# Patient Record
Sex: MALE | Race: BLACK OR AFRICAN AMERICAN | NOT HISPANIC OR LATINO | Employment: FULL TIME | RURAL
[De-identification: names, ages, dates, MRNs, and addresses within clinical notes are randomized per-mention and may not be internally consistent; named-entity substitution may affect disease eponyms.]

---

## 2021-08-02 ENCOUNTER — HOSPITAL ENCOUNTER (EMERGENCY)
Facility: HOSPITAL | Age: 31
Discharge: HOME | End: 2021-08-02

## 2021-08-02 VITALS
HEIGHT: 67 IN | WEIGHT: 155 LBS | HEART RATE: 66 BPM | SYSTOLIC BLOOD PRESSURE: 120 MMHG | OXYGEN SATURATION: 98 % | RESPIRATION RATE: 16 BRPM | BODY MASS INDEX: 24.33 KG/M2 | TEMPERATURE: 99 F | DIASTOLIC BLOOD PRESSURE: 80 MMHG

## 2021-08-02 DIAGNOSIS — M62.838 MUSCLE SPASM: Primary | ICD-10-CM

## 2021-08-02 PROCEDURE — 99282 EMERGENCY DEPT VISIT SF MDM: CPT

## 2021-08-02 PROCEDURE — 99283 EMERGENCY DEPT VISIT LOW MDM: CPT | Mod: ,,, | Performed by: NURSE PRACTITIONER

## 2021-08-02 PROCEDURE — 99283 PR EMERGENCY DEPT VISIT,LEVEL III: ICD-10-PCS | Mod: ,,, | Performed by: NURSE PRACTITIONER

## 2021-08-02 RX ORDER — METHOCARBAMOL 500 MG/1
1000 TABLET, FILM COATED ORAL 3 TIMES DAILY
Qty: 30 TABLET | Refills: 0 | Status: SHIPPED | OUTPATIENT
Start: 2021-08-02 | End: 2021-08-07

## 2021-08-02 RX ORDER — IBUPROFEN 800 MG/1
800 TABLET ORAL 3 TIMES DAILY
Qty: 15 TABLET | Refills: 0 | Status: SHIPPED | OUTPATIENT
Start: 2021-08-02 | End: 2021-08-07

## 2022-01-22 ENCOUNTER — HOSPITAL ENCOUNTER (EMERGENCY)
Facility: HOSPITAL | Age: 32
Discharge: HOME | End: 2022-01-22

## 2022-01-22 VITALS
TEMPERATURE: 99 F | SYSTOLIC BLOOD PRESSURE: 125 MMHG | WEIGHT: 150 LBS | OXYGEN SATURATION: 98 % | DIASTOLIC BLOOD PRESSURE: 72 MMHG | HEART RATE: 80 BPM | BODY MASS INDEX: 23.54 KG/M2 | HEIGHT: 67 IN | RESPIRATION RATE: 18 BRPM

## 2022-01-22 DIAGNOSIS — L03.90 CELLULITIS, UNSPECIFIED CELLULITIS SITE: Primary | ICD-10-CM

## 2022-01-22 PROCEDURE — 99283 EMERGENCY DEPT VISIT LOW MDM: CPT

## 2022-01-22 PROCEDURE — 99283 EMERGENCY DEPT VISIT LOW MDM: CPT | Mod: ,,, | Performed by: NURSE PRACTITIONER

## 2022-01-22 PROCEDURE — 99283 PR EMERGENCY DEPT VISIT,LEVEL III: ICD-10-PCS | Mod: ,,, | Performed by: NURSE PRACTITIONER

## 2022-01-22 RX ORDER — CLINDAMYCIN HYDROCHLORIDE 150 MG/1
300 CAPSULE ORAL 4 TIMES DAILY
Qty: 56 CAPSULE | Refills: 0 | Status: SHIPPED | OUTPATIENT
Start: 2022-01-22 | End: 2022-01-29

## 2022-01-22 NOTE — ED TRIAGE NOTES
Pt presents to the ed c/o a reaction to his antibiotic that he is currently taking for cellulitis

## 2022-01-22 NOTE — DISCHARGE INSTRUCTIONS
Stop taking Bactrim DS.  May take tramadol as needed.  Follow-up with primary care provider on 01/24/2022 or sooner as needed.

## 2022-01-22 NOTE — ED PROVIDER NOTES
Encounter Date: 1/22/2022       History     Chief Complaint   Patient presents with    Headache    Diarrhea     Year old male presents ambulatory to the emergency department with complaint of frontal headache, nausea, occasional diarrhea.  States symptoms began after starting on Bactrim DS.  He was reportedly seen at [facility] on 01/14/2022.  He was treated for cellulitis of his right forearm.  He states he has gotten much better.  States that he was also given tramadol  to take it with Bactrim daily.  He has not been able to work due to concerns of tramadol causing dizziness.  States that he has had no fever.  It ranges from 97 to 90 8° F. states that he has the symptoms of nausea headache and abdominal discomfort shortly after taking Bactrim DS.          Review of patient's allergies indicates:   Allergen Reactions    Amoxicillin Anaphylaxis    Iodine and iodide containing products Itching and Swelling     History reviewed. No pertinent past medical history.  History reviewed. No pertinent surgical history.  History reviewed. No pertinent family history.  Social History     Tobacco Use    Smoking status: Current Every Day Smoker    Smokeless tobacco: Never Used   Substance Use Topics    Alcohol use: Never     Review of Systems   Constitutional: Positive for appetite change. Negative for activity change, fatigue and fever.   HENT: Negative for sore throat and trouble swallowing.    Respiratory: Negative for cough, chest tightness, shortness of breath and wheezing.    Cardiovascular: Negative for chest pain and leg swelling.   Gastrointestinal: Positive for diarrhea and nausea. Negative for abdominal distention, abdominal pain, constipation and vomiting.   Genitourinary: Negative for decreased urine volume, difficulty urinating and urgency.   Musculoskeletal: Negative for arthralgias, back pain, joint swelling and myalgias.   Skin:        Cellulitis right forearm   Neurological:  Positive for headaches. Negative for speech difficulty, weakness, light-headedness and numbness.       Physical Exam     Initial Vitals [01/22/22 1002]   BP Pulse Resp Temp SpO2   125/72 80 18 98.8 °F (37.1 °C) 98 %      MAP       --         Physical Exam    Constitutional: He appears well-developed and well-nourished.   HENT:   Head: Normocephalic.   Right Ear: External ear normal.   Left Ear: External ear normal.   Nose: Nose normal.   Mouth/Throat: Oropharynx is clear and moist.   Eyes: Conjunctivae are normal. Pupils are equal, round, and reactive to light.   Neck: Neck supple.   Cardiovascular: Normal rate, regular rhythm, normal heart sounds and intact distal pulses.   Pulmonary/Chest: Breath sounds normal.   Abdominal: Abdomen is soft. Bowel sounds are normal. There is no abdominal tenderness. There is no rebound and no guarding.   Musculoskeletal:      Cervical back: Neck supple.     Lymphadenopathy:     He has no cervical adenopathy.   Neurological: He is alert and oriented to person, place, and time. GCS score is 15. GCS eye subscore is 4. GCS verbal subscore is 5. GCS motor subscore is 6.   Skin: Skin is warm and dry. Capillary refill takes less than 2 seconds.   Tear drop shaped soft fluid-filled area noted in the middle of his right forearm dorsal surface.  No redness.  No warmth.  No pain.  No streaking or redness.         Medical Screening Exam   See Full Note    ED Course   Procedures  Labs Reviewed - No data to display       Imaging Results    None          Medications - No data to display                    Clinical Impression:   Final diagnoses:  [L03.90] Cellulitis, unspecified cellulitis site (Primary)          ED Disposition Condition    Discharge Stable        ED Prescriptions     Medication Sig Dispense Start Date End Date Auth. Provider    clindamycin (CLEOCIN) 150 MG capsule Take 2 capsules (300 mg total) by mouth 4 (four) times daily. for 7 days 56 capsule 1/22/2022 1/29/2022 Dimitrios HOWELL  ARTURO Paez        Follow-up Information    None          Dimitrios Paez, ARTURO  01/22/22 2459

## 2022-05-26 ENCOUNTER — LAB VISIT (OUTPATIENT)
Dept: PRIMARY CARE CLINIC | Facility: CLINIC | Age: 32
End: 2022-05-26

## 2022-05-26 DIAGNOSIS — Z02.83 ENCOUNTER FOR DRUG SCREENING: Primary | ICD-10-CM

## 2022-05-26 PROCEDURE — 99000 PR SPECIMEN HANDLING,DR OFF->LAB: ICD-10-PCS | Mod: ,,, | Performed by: NURSE PRACTITIONER

## 2022-05-26 PROCEDURE — 99000 SPECIMEN HANDLING OFFICE-LAB: CPT | Mod: ,,, | Performed by: NURSE PRACTITIONER

## 2022-10-26 ENCOUNTER — HOSPITAL ENCOUNTER (EMERGENCY)
Facility: HOSPITAL | Age: 32
Discharge: HOME | End: 2022-10-26

## 2022-10-26 VITALS
BODY MASS INDEX: 22.44 KG/M2 | RESPIRATION RATE: 20 BRPM | WEIGHT: 143 LBS | OXYGEN SATURATION: 100 % | DIASTOLIC BLOOD PRESSURE: 81 MMHG | HEART RATE: 67 BPM | TEMPERATURE: 98 F | SYSTOLIC BLOOD PRESSURE: 119 MMHG | HEIGHT: 67 IN

## 2022-10-26 DIAGNOSIS — J11.1 INFLUENZA: Primary | ICD-10-CM

## 2022-10-26 PROCEDURE — 99284 PR EMERGENCY DEPT VISIT,LEVEL IV: ICD-10-PCS | Mod: ,,, | Performed by: NURSE PRACTITIONER

## 2022-10-26 PROCEDURE — 99283 EMERGENCY DEPT VISIT LOW MDM: CPT

## 2022-10-26 PROCEDURE — 99284 EMERGENCY DEPT VISIT MOD MDM: CPT | Mod: ,,, | Performed by: NURSE PRACTITIONER

## 2022-10-26 RX ORDER — ONDANSETRON 4 MG/1
4 TABLET, ORALLY DISINTEGRATING ORAL EVERY 8 HOURS PRN
Qty: 10 TABLET | Refills: 0 | Status: SHIPPED | OUTPATIENT
Start: 2022-10-26

## 2022-10-26 NOTE — Clinical Note
"Rajni LONG" Remy was seen and treated in our emergency department on 10/26/2022.  He may return to work on 10/28/2022.       If you have any questions or concerns, please don't hesitate to call.      ARTURO Sidhu"

## 2022-10-26 NOTE — ED PROVIDER NOTES
Encounter Date: 10/26/2022       History     Chief Complaint   Patient presents with    Abdominal Pain    Cough    Generalized Body Aches    Sore Throat     31 year old male presents to the emergency department to be evaluated for runny nose, cough, nausea, vomiting and diarrhea. He has had a runny nose and cough for the past 4 days. He began vomiting a couple of days ago. Denies any nausea or vomiting today. He has been drinking fluids without any difficulty. He has been around several people who tested positive for flu.     The history is provided by the patient.   Emesis   This is a new problem. The current episode started two days ago. Associated symptoms include cough, diarrhea and URI. Pertinent negatives include no abdominal pain, no arthralgias, no chills, no fever, no headaches, no myalgias and no sweats.   Review of patient's allergies indicates:   Allergen Reactions    Amoxicillin Anaphylaxis    Iodine and iodide containing products Itching and Swelling     No past medical history on file.  No past surgical history on file.  No family history on file.  Social History     Tobacco Use    Smoking status: Every Day    Smokeless tobacco: Never   Substance Use Topics    Alcohol use: Never     Review of Systems   Constitutional:  Negative for chills and fever.   HENT:  Positive for congestion and rhinorrhea.    Respiratory:  Positive for cough.    Gastrointestinal:  Positive for diarrhea, nausea and vomiting. Negative for abdominal pain.   Musculoskeletal:  Negative for arthralgias and myalgias.   Neurological:  Negative for headaches.   All other systems reviewed and are negative.    Physical Exam     Initial Vitals [10/26/22 1048]   BP Pulse Resp Temp SpO2   119/81 67 20 98.2 °F (36.8 °C) 100 %      MAP       --         Physical Exam    Vitals reviewed.  Constitutional: He appears well-developed and well-nourished.   HENT:   Right Ear: Tympanic membrane normal.   Left Ear: Tympanic membrane normal.    Mouth/Throat: Oropharynx is clear and moist.   Neck: Neck supple.   Cardiovascular:  Normal rate and regular rhythm.           Pulmonary/Chest: Breath sounds normal.   Abdominal: Abdomen is soft. Bowel sounds are normal. He exhibits no distension and no mass. There is no abdominal tenderness. There is no rebound and no guarding.   Musculoskeletal:         General: Normal range of motion.      Cervical back: Neck supple.     Neurological: He is alert and oriented to person, place, and time. He has normal strength. GCS score is 15. GCS eye subscore is 4. GCS verbal subscore is 5. GCS motor subscore is 6.   Skin: Skin is warm and dry. Capillary refill takes less than 2 seconds.   Psychiatric: He has a normal mood and affect.       Medical Screening Exam   See Full Note    ED Course   Procedures  Labs Reviewed - No data to display       Imaging Results    None          Medications - No data to display                    Clinical Impression:   Final diagnoses:  [J11.1] Influenza (Primary)      ED Disposition Condition    Discharge Stable          ED Prescriptions       Medication Sig Dispense Start Date End Date Auth. Provider    ondansetron (ZOFRAN-ODT) 4 MG TbDL Take 1 tablet (4 mg total) by mouth every 8 (eight) hours as needed. 10 tablet 10/26/2022 -- ARTURO Sidhu          Follow-up Information    None          ARTURO Sidhu  10/26/22 1126

## 2022-10-26 NOTE — ED TRIAGE NOTES
Started having body aches, fever, cough with mucous production, n/v/d, and sore throat since Thursday. Took ibuprofen this am for temp of 100.0 and taking theraflu

## 2022-10-26 NOTE — DISCHARGE INSTRUCTIONS
Clear liquid diet. Advance diet as tolerated. Take zofran as needed for nausea. Follow up with your primary care provider in 2 days. Return to the emergency department for any increase in symptoms or for any other new or worrisome symptoms.

## 2022-10-27 ENCOUNTER — TELEPHONE (OUTPATIENT)
Dept: EMERGENCY MEDICINE | Facility: HOSPITAL | Age: 32
End: 2022-10-27

## 2023-04-10 ENCOUNTER — HOSPITAL ENCOUNTER (OUTPATIENT)
Dept: RADIOLOGY | Facility: HOSPITAL | Age: 33
Discharge: HOME | End: 2023-04-10
Attending: NURSE PRACTITIONER

## 2023-04-10 DIAGNOSIS — M25.572 LEFT ANKLE PAIN: ICD-10-CM

## 2023-04-10 PROCEDURE — 73610 X-RAY EXAM OF ANKLE: CPT | Mod: TC,LT

## 2023-04-10 PROCEDURE — 73610 X-RAY EXAM OF ANKLE: CPT | Mod: 26,LT,, | Performed by: RADIOLOGY

## 2023-04-10 PROCEDURE — 73610 XR ANKLE COMPLETE 3 VIEW LEFT: ICD-10-PCS | Mod: 26,LT,, | Performed by: RADIOLOGY

## 2024-02-13 ENCOUNTER — HOSPITAL ENCOUNTER (EMERGENCY)
Facility: HOSPITAL | Age: 34
Discharge: HOME | End: 2024-02-13

## 2024-02-13 VITALS
TEMPERATURE: 98 F | WEIGHT: 159.88 LBS | RESPIRATION RATE: 18 BRPM | HEART RATE: 70 BPM | SYSTOLIC BLOOD PRESSURE: 132 MMHG | OXYGEN SATURATION: 100 % | HEIGHT: 67 IN | BODY MASS INDEX: 25.09 KG/M2 | DIASTOLIC BLOOD PRESSURE: 75 MMHG

## 2024-02-13 DIAGNOSIS — J03.90 ACUTE TONSILLITIS, UNSPECIFIED ETIOLOGY: Primary | ICD-10-CM

## 2024-02-13 LAB
FLUAV AG UPPER RESP QL IA.RAPID: NEGATIVE
FLUBV AG UPPER RESP QL IA.RAPID: NEGATIVE
RAPID GROUP A STREP: NEGATIVE
SARS-COV-2 RDRP RESP QL NAA+PROBE: NEGATIVE

## 2024-02-13 PROCEDURE — 87804 INFLUENZA ASSAY W/OPTIC: CPT | Performed by: NURSE PRACTITIONER

## 2024-02-13 PROCEDURE — 87635 SARS-COV-2 COVID-19 AMP PRB: CPT | Performed by: NURSE PRACTITIONER

## 2024-02-13 PROCEDURE — 87880 STREP A ASSAY W/OPTIC: CPT | Performed by: NURSE PRACTITIONER

## 2024-02-13 PROCEDURE — 99284 EMERGENCY DEPT VISIT MOD MDM: CPT | Mod: ,,, | Performed by: NURSE PRACTITIONER

## 2024-02-13 PROCEDURE — 96375 TX/PRO/DX INJ NEW DRUG ADDON: CPT

## 2024-02-13 PROCEDURE — 99284 EMERGENCY DEPT VISIT MOD MDM: CPT | Mod: 25

## 2024-02-13 PROCEDURE — 63600175 PHARM REV CODE 636 W HCPCS: Performed by: NURSE PRACTITIONER

## 2024-02-13 PROCEDURE — 96361 HYDRATE IV INFUSION ADD-ON: CPT

## 2024-02-13 PROCEDURE — 96374 THER/PROPH/DIAG INJ IV PUSH: CPT

## 2024-02-13 PROCEDURE — 25000003 PHARM REV CODE 250: Performed by: NURSE PRACTITIONER

## 2024-02-13 RX ORDER — AZITHROMYCIN 250 MG/1
250 TABLET, FILM COATED ORAL DAILY
Qty: 6 TABLET | Refills: 0 | Status: SHIPPED | OUTPATIENT
Start: 2024-02-13

## 2024-02-13 RX ORDER — BENZONATATE 100 MG/1
100 CAPSULE ORAL 3 TIMES DAILY PRN
Qty: 20 CAPSULE | Refills: 0 | Status: SHIPPED | OUTPATIENT
Start: 2024-02-13 | End: 2024-02-23

## 2024-02-13 RX ORDER — ONDANSETRON HYDROCHLORIDE 2 MG/ML
4 INJECTION, SOLUTION INTRAVENOUS
Status: COMPLETED | OUTPATIENT
Start: 2024-02-13 | End: 2024-02-13

## 2024-02-13 RX ORDER — BENZONATATE 100 MG/1
100 CAPSULE ORAL 3 TIMES DAILY PRN
Qty: 20 CAPSULE | Refills: 0 | Status: SHIPPED | OUTPATIENT
Start: 2024-02-13 | End: 2024-02-13

## 2024-02-13 RX ORDER — ONDANSETRON 4 MG/1
4 TABLET, ORALLY DISINTEGRATING ORAL EVERY 6 HOURS PRN
Qty: 15 TABLET | Refills: 0 | Status: SHIPPED | OUTPATIENT
Start: 2024-02-13

## 2024-02-13 RX ORDER — METHYLPREDNISOLONE 4 MG/1
TABLET ORAL
Qty: 21 EACH | Refills: 0 | Status: SHIPPED | OUTPATIENT
Start: 2024-02-13 | End: 2024-03-05

## 2024-02-13 RX ORDER — DEXAMETHASONE SODIUM PHOSPHATE 4 MG/ML
4 INJECTION, SOLUTION INTRA-ARTICULAR; INTRALESIONAL; INTRAMUSCULAR; INTRAVENOUS; SOFT TISSUE
Status: COMPLETED | OUTPATIENT
Start: 2024-02-13 | End: 2024-02-13

## 2024-02-13 RX ADMIN — SODIUM CHLORIDE 1000 ML: 9 INJECTION, SOLUTION INTRAVENOUS at 07:02

## 2024-02-13 RX ADMIN — DEXAMETHASONE SODIUM PHOSPHATE 4 MG: 4 INJECTION, SOLUTION INTRA-ARTICULAR; INTRALESIONAL; INTRAMUSCULAR; INTRAVENOUS; SOFT TISSUE at 07:02

## 2024-02-13 RX ADMIN — ONDANSETRON 4 MG: 2 INJECTION INTRAMUSCULAR; INTRAVENOUS at 07:02

## 2024-02-13 NOTE — Clinical Note
"Rajni LONG" Remy was seen and treated in our emergency department on 2/13/2024.  He may return to work on 02/16/2024.       If you have any questions or concerns, please don't hesitate to call.      Rosaura Bustos, FNP"

## 2024-02-14 NOTE — DISCHARGE INSTRUCTIONS
Take medications prescribed.  Monitor fever and treated greater than 100.3 with alterations of Tylenol and ibuprofen.  Follow up with primary care provider in 2 days if symptoms do not improve with treatment.  Return to the ER with new or worsening symptoms.

## 2024-02-14 NOTE — ED PROVIDER NOTES
Encounter Date: 2/13/2024       History     Chief Complaint   Patient presents with    Generalized Body Aches    Vomiting    Nausea    Headache     Patient presents to ER with complaint of generalized body aches, chills, and cough.  Patient reports decreased appetite, Reports nausea, vomiting, and diarrhea. Reports sore throat and headache.  Cough is non-productive.  Reports his symptoms started 4 days ago.  He denies fever.  Reports he feels dehydrated.    The history is provided by the patient and a parent. No  was used.     Review of patient's allergies indicates:   Allergen Reactions    Amoxicillin Anaphylaxis    Iodine and iodide containing products Itching and Swelling     History reviewed. No pertinent past medical history.  History reviewed. No pertinent surgical history.  History reviewed. No pertinent family history.  Social History     Tobacco Use    Smoking status: Every Day    Smokeless tobacco: Never   Substance Use Topics    Alcohol use: Never     Review of Systems   Constitutional:  Positive for activity change, appetite change, chills, fatigue and fever.   HENT:  Positive for congestion, postnasal drip, sore throat and trouble swallowing.    Respiratory:  Positive for cough.    Gastrointestinal:  Positive for diarrhea, nausea and vomiting.   Neurological:  Positive for headaches.   All other systems reviewed and are negative.      Physical Exam     Initial Vitals [02/13/24 1820]   BP Pulse Resp Temp SpO2   120/72 83 18 98.4 °F (36.9 °C) 100 %      MAP       --         Physical Exam    Nursing note and vitals reviewed.  Constitutional: He appears well-developed and well-nourished.   HENT:   Head: Normocephalic.   Right Ear: External ear normal.   Left Ear: External ear normal.   Nose: Nose normal.   Mouth/Throat: Oropharyngeal exudate present.   Eyes: EOM are normal.   Neck:   Normal range of motion.  Cardiovascular:  Normal rate, normal heart sounds and intact distal pulses.            Pulmonary/Chest: Breath sounds normal.   Abdominal: Abdomen is soft. Bowel sounds are normal.   Musculoskeletal:         General: Normal range of motion.      Cervical back: Normal range of motion.     Neurological: He is alert and oriented to person, place, and time. He has normal strength. GCS score is 15. GCS eye subscore is 4. GCS verbal subscore is 5. GCS motor subscore is 6.   Skin: Skin is warm and dry. Capillary refill takes less than 2 seconds.   Psychiatric: He has a normal mood and affect. His behavior is normal. Judgment and thought content normal.         Medical Screening Exam   See Full Note    ED Course   Procedures  Labs Reviewed   RAPID INFLUENZA A/B - Normal   THROAT SCREEN, RAPID STREP - Normal   SARS-COV-2 RNA AMPLIFICATION, QUAL - Normal    Narrative:     Negative SARS-CoV results should not be used as the sole basis for treatment or patient management decisions; negative results should be considered in the context of a patient's recent exposures, history and the presene of clinical signs and symptoms consistent with COVID-19.  Negative results should be treated as presumptive and confirmed by molecular assay, if necessary for patient management.          Imaging Results    None          Medications   sodium chloride 0.9% bolus 1,000 mL 1,000 mL (1,000 mLs Intravenous New Bag 2/13/24 1900)   dexAMETHasone injection 4 mg (has no administration in time range)   ondansetron injection 4 mg (4 mg Intravenous Given 2/13/24 1900)     Medical Decision Making  Patient presents to ER with complaint of generalized body aches, chills, and cough.  Patient reports decreased appetite, Reports nausea, vomiting, and diarrhea. Reports sore throat and headache.  Cough is non-productive.  Reports his symptoms started 4 days ago.  He denies fever.  Reports he feels dehydrated.      Amount and/or Complexity of Data Reviewed  Independent Historian: parent  Labs: ordered. Decision-making details documented in ED  Course.  Discussion of management or test interpretation with external provider(s): Initiate IV, collect lab work  1 L normal saline bolus  4 mg Zofran IV treat nausea  Decadron 4 mg IV to treat inflammation and acute tonsillitis.    Patient was discharged home with diagnosis of acute tonsillitis.  He was given prescription for Zofran, Tessalon Perles, Medrol Dosepak and azithromycin.    Risk  Prescription drug management.                                      Clinical Impression:   Final diagnoses:  [J03.90] Acute tonsillitis, unspecified etiology (Primary)        ED Disposition Condition    Discharge Stable          ED Prescriptions       Medication Sig Dispense Start Date End Date Auth. Provider    ondansetron (ZOFRAN-ODT) 4 MG TbDL Take 1 tablet (4 mg total) by mouth every 6 (six) hours as needed (Nausea vomiting). 15 tablet 2/13/2024 -- Rosaura Bustos FNP    azithromycin (Z-EDWIGE) 250 MG tablet Take 1 tablet (250 mg total) by mouth once daily. Take first 2 tablets together, then 1 every day until finished. 6 tablet 2/13/2024 -- Rosaura Bustos FNP    methylPREDNISolone (MEDROL DOSEPACK) 4 mg tablet use as directed 21 each 2/13/2024 3/5/2024 Rosaura Bustos R, FNP    benzonatate (TESSALON) 100 MG capsule  (Status: Discontinued) Take 1 capsule (100 mg total) by mouth 3 (three) times daily as needed for Cough. 20 capsule 2/13/2024 2/13/2024 Rosaura Bustos, FNP    benzonatate (TESSALON) 100 MG capsule Take 1 capsule (100 mg total) by mouth 3 (three) times daily as needed for Cough. 20 capsule 2/13/2024 2/23/2024 Rosaura Bustos FNP          Follow-up Information       Follow up With Specialties Details Why Contact Info    Primary care provider  Schedule an appointment as soon as possible for a visit in 2 days If symptoms worsen              Rosaura Bustos FNP  02/13/24 1953

## 2024-10-23 ENCOUNTER — HOSPITAL ENCOUNTER (EMERGENCY)
Facility: HOSPITAL | Age: 34
Discharge: HOME | End: 2024-10-23

## 2024-10-23 VITALS
OXYGEN SATURATION: 100 % | SYSTOLIC BLOOD PRESSURE: 126 MMHG | HEIGHT: 67 IN | TEMPERATURE: 99 F | RESPIRATION RATE: 17 BRPM | DIASTOLIC BLOOD PRESSURE: 88 MMHG | BODY MASS INDEX: 24.01 KG/M2 | WEIGHT: 153 LBS | HEART RATE: 55 BPM

## 2024-10-23 DIAGNOSIS — J06.9 UPPER RESPIRATORY TRACT INFECTION, UNSPECIFIED TYPE: Primary | ICD-10-CM

## 2024-10-23 LAB
INFLUENZA A MOLECULAR (OHS): NEGATIVE
INFLUENZA B MOLECULAR (OHS): NEGATIVE
SARS-COV-2 RDRP RESP QL NAA+PROBE: NEGATIVE

## 2024-10-23 PROCEDURE — 99284 EMERGENCY DEPT VISIT MOD MDM: CPT

## 2024-10-23 PROCEDURE — 87635 SARS-COV-2 COVID-19 AMP PRB: CPT | Performed by: NURSE PRACTITIONER

## 2024-10-23 PROCEDURE — 87502 INFLUENZA DNA AMP PROBE: CPT | Performed by: NURSE PRACTITIONER

## 2024-10-23 RX ORDER — ONDANSETRON 4 MG/1
4 TABLET, ORALLY DISINTEGRATING ORAL EVERY 8 HOURS PRN
Qty: 15 TABLET | Refills: 0 | Status: SHIPPED | OUTPATIENT
Start: 2024-10-23

## 2024-10-23 RX ORDER — PROMETHAZINE HYDROCHLORIDE 25 MG/1
25 TABLET ORAL EVERY 6 HOURS PRN
Qty: 15 TABLET | Refills: 0 | Status: SHIPPED | OUTPATIENT
Start: 2024-10-23

## 2024-10-23 RX ORDER — LORATADINE PSEUDOEPHEDRINE SULFATE 10; 240 MG/1; MG/1
1 TABLET, EXTENDED RELEASE ORAL DAILY
COMMUNITY
Start: 2024-10-23 | End: 2024-11-02

## 2025-03-10 ENCOUNTER — HOSPITAL ENCOUNTER (EMERGENCY)
Facility: HOSPITAL | Age: 35
Discharge: HOME | End: 2025-03-10
Attending: FAMILY MEDICINE

## 2025-03-10 VITALS
HEIGHT: 67 IN | WEIGHT: 150 LBS | BODY MASS INDEX: 23.54 KG/M2 | TEMPERATURE: 98 F | RESPIRATION RATE: 17 BRPM | OXYGEN SATURATION: 99 % | SYSTOLIC BLOOD PRESSURE: 149 MMHG | DIASTOLIC BLOOD PRESSURE: 91 MMHG | HEART RATE: 58 BPM

## 2025-03-10 DIAGNOSIS — K59.00 CONSTIPATION, UNSPECIFIED CONSTIPATION TYPE: ICD-10-CM

## 2025-03-10 DIAGNOSIS — R11.2 NAUSEA AND VOMITING, UNSPECIFIED VOMITING TYPE: Primary | ICD-10-CM

## 2025-03-10 DIAGNOSIS — R10.9 ABDOMINAL PAIN: ICD-10-CM

## 2025-03-10 PROCEDURE — 25000003 PHARM REV CODE 250: Performed by: FAMILY MEDICINE

## 2025-03-10 PROCEDURE — 99284 EMERGENCY DEPT VISIT MOD MDM: CPT | Mod: 25

## 2025-03-10 RX ORDER — PROMETHAZINE HYDROCHLORIDE 25 MG/1
25 TABLET ORAL EVERY 6 HOURS PRN
Qty: 30 TABLET | Refills: 0 | Status: SHIPPED | OUTPATIENT
Start: 2025-03-10

## 2025-03-10 RX ORDER — SYRING-NEEDL,DISP,INSUL,0.3 ML 29 G X1/2"
296 SYRINGE, EMPTY DISPOSABLE MISCELLANEOUS
Status: COMPLETED | OUTPATIENT
Start: 2025-03-10 | End: 2025-03-10

## 2025-03-10 RX ORDER — ONDANSETRON 4 MG/1
4 TABLET, ORALLY DISINTEGRATING ORAL EVERY 8 HOURS PRN
Qty: 30 TABLET | Refills: 0 | Status: SHIPPED | OUTPATIENT
Start: 2025-03-10

## 2025-03-10 RX ADMIN — MAGNESIUM CITRATE 296 ML: 1.75 LIQUID ORAL at 09:03

## 2025-03-10 NOTE — Clinical Note
"Rajni LONG" Remy was seen and treated in our emergency department on 3/10/2025.  He may return to work on 03/12/2025.       If you have any questions or concerns, please don't hesitate to call.      Anjum Montano, DO"

## 2025-03-10 NOTE — ED PROVIDER NOTES
"Encounter Date: 3/10/2025       History     Chief Complaint   Patient presents with    Abdominal Pain     Reports "bad pains in the lower abd"    Vomiting     Pt reports s/s started this AM.      PATIENT IN ABDOMINAL PAIN WHICH LOWER BILATERAL.  COMES IN NAUSEA VOMITING NO OTHER COMPLAINTS HAS SOME BACK PAIN BUT MINIMAL.        Review of patient's allergies indicates:   Allergen Reactions    Amoxicillin Anaphylaxis    Iodine and iodide containing products Itching and Swelling     No past medical history on file.  No past surgical history on file.  No family history on file.  Social History[1]  Review of Systems   Constitutional:  Positive for fatigue. Negative for fever.   HENT: Negative.  Negative for sore throat.    Eyes: Negative.    Respiratory: Negative.  Negative for shortness of breath.    Cardiovascular: Negative.  Negative for chest pain.   Gastrointestinal:  Positive for nausea and vomiting.   Endocrine: Negative.    Genitourinary: Negative.  Negative for dysuria.   Musculoskeletal: Negative.  Negative for back pain.   Skin: Negative.  Negative for rash.   Allergic/Immunologic: Negative.    Neurological: Negative.  Negative for weakness.   Hematological: Negative.  Does not bruise/bleed easily.   Psychiatric/Behavioral: Negative.         Physical Exam     Initial Vitals [03/10/25 0737]   BP Pulse Resp Temp SpO2   (!) 149/91 (!) 58 17 98.2 °F (36.8 °C) 99 %      MAP       --         Physical Exam    Constitutional: He appears well-developed and well-nourished.   HENT:   Head: Normocephalic and atraumatic.   Right Ear: External ear normal.   Left Ear: External ear normal.   Nose: Nose normal. Mouth/Throat: Oropharynx is clear and moist.   Eyes: Conjunctivae and EOM are normal. Pupils are equal, round, and reactive to light.   Neck: Neck supple.   Normal range of motion.  Cardiovascular:  Normal rate, regular rhythm, normal heart sounds and intact distal pulses.           Pulmonary/Chest: Breath sounds " normal.   Abdominal: Abdomen is soft. Bowel sounds are normal.   NO REBOUND TENDERNESS   Genitourinary:    Prostate and penis normal.     Musculoskeletal:         General: Normal range of motion.      Cervical back: Normal range of motion and neck supple.     Neurological: He is alert and oriented to person, place, and time. He has normal strength and normal reflexes.   Skin: Skin is warm and dry.   Psychiatric: He has a normal mood and affect. His behavior is normal. Judgment and thought content normal.         Medical Screening Exam   See Full Note    ED Course   Procedures  Labs Reviewed - No data to display       Imaging Results              X-Ray Abdomen Flat And Erect (In process)                      Medications   magnesium citrate solution 296 mL (has no administration in time range)     Medical Decision Making  Amount and/or Complexity of Data Reviewed  Radiology: ordered.                          Medical Decision Making:   Initial Assessment:   PATIENT IN ABDOMINAL PAIN WHICH LOWER BILATERAL.  COMES IN NAUSEA VOMITING NO OTHER COMPLAINTS HAS SOME BACK PAIN BUT MINIMAL.        Differential Diagnosis:   ABDOMINAL PAIN NAUSEA VOMITING ACUTE GASTROENTERITIS             Clinical Impression:   Final diagnoses:  [R10.9] Abdominal pain  [R11.2] Nausea and vomiting, unspecified vomiting type (Primary)  [K59.00] Constipation, unspecified constipation type        ED Disposition Condition    Discharge Stable          ED Prescriptions       Medication Sig Dispense Start Date End Date Auth. Provider    ondansetron (ZOFRAN-ODT) 4 MG TbDL Take 1 tablet (4 mg total) by mouth every 8 (eight) hours as needed (nausea, vomiting). 30 tablet 3/10/2025 -- Anjum Montano, DO    promethazine (PHENERGAN) 25 MG tablet Take 1 tablet (25 mg total) by mouth every 6 (six) hours as needed for Nausea. This medication may  make you sleepy 30 tablet 3/10/2025 -- Anjum Montano, DO          Follow-up Information    None             [1]   Social History  Tobacco Use    Smoking status: Every Day    Smokeless tobacco: Never   Substance Use Topics    Alcohol use: Never        Anjum Montano,   03/10/25 0856

## 2025-04-23 ENCOUNTER — HOSPITAL ENCOUNTER (EMERGENCY)
Facility: HOSPITAL | Age: 35
Discharge: HOME | End: 2025-04-23

## 2025-04-23 VITALS
OXYGEN SATURATION: 99 % | SYSTOLIC BLOOD PRESSURE: 117 MMHG | BODY MASS INDEX: 27.11 KG/M2 | DIASTOLIC BLOOD PRESSURE: 73 MMHG | HEART RATE: 54 BPM | TEMPERATURE: 99 F | WEIGHT: 153 LBS | RESPIRATION RATE: 18 BRPM | HEIGHT: 63 IN

## 2025-04-23 DIAGNOSIS — Z02.89 ENCOUNTER TO OBTAIN EXCUSE FROM WORK: ICD-10-CM

## 2025-04-23 DIAGNOSIS — S39.012A STRAIN OF LUMBAR REGION, INITIAL ENCOUNTER: Primary | ICD-10-CM

## 2025-04-23 PROCEDURE — 99284 EMERGENCY DEPT VISIT MOD MDM: CPT

## 2025-04-23 RX ORDER — METHYLPREDNISOLONE 4 MG/1
TABLET ORAL
Qty: 1 TABLET | Refills: 0 | Status: SHIPPED | OUTPATIENT
Start: 2025-04-23 | End: 2025-05-14

## 2025-04-23 RX ORDER — METHOCARBAMOL 500 MG/1
500 TABLET, FILM COATED ORAL 3 TIMES DAILY
Qty: 15 TABLET | Refills: 0 | Status: SHIPPED | OUTPATIENT
Start: 2025-04-23 | End: 2025-04-28

## 2025-04-23 RX ORDER — IBUPROFEN 800 MG/1
800 TABLET ORAL EVERY 6 HOURS PRN
Qty: 20 TABLET | Refills: 0 | Status: SHIPPED | OUTPATIENT
Start: 2025-04-23

## 2025-04-23 NOTE — Clinical Note
"Rajni LONG" Remy was seen and treated in our emergency department on 4/23/2025.  He may return to work on 04/24/2025.       If you have any questions or concerns, please don't hesitate to call.      Narinder Iqbal, ARTURO"

## 2025-04-23 NOTE — Clinical Note
"Rajni LONG" Remy was seen and treated in our emergency department on 4/23/2025.  He may return to work on 04/25/2025.       If you have any questions or concerns, please don't hesitate to call.      Narinder Iqbal, ARTURO"

## 2025-04-24 NOTE — DISCHARGE INSTRUCTIONS
- take medication as directed by label on the bottle.  - try heating pad as needed for muscle spasm.  - follow up with local PCP if symptoms continue.

## 2025-04-24 NOTE — ED PROVIDER NOTES
Encounter Date: 4/23/2025       History     Chief Complaint   Patient presents with    Back Pain     Upper       LM is a 35 y/o AAM.    Patient has a PMHx of GERD.    Patient presents to the ED POV with c/o thoracic muscle strain s/p trampoline injury. Patient stated that he dove to catch his daughter from falling and pulled a muscle in his back on Sunday of this past week. Patient works for [employer] lifting boxes which has worsened his condition.     The history is provided by the patient.   Back Pain   This is a recurrent problem. The current episode started several days ago. The problem has been unchanged. The pain is associated with falling. The pain is present in the thoracic spine. The quality of the pain is described as cramping. The pain does not radiate. The pain is at a severity of 5/10. The symptoms are aggravated by bending and twisting. The pain is Worse during the day. Pertinent negatives include no numbness and no weakness. He has tried nothing for the symptoms.     Review of patient's allergies indicates:   Allergen Reactions    Amoxicillin Anaphylaxis    Iodine and iodide containing products Itching and Swelling     History reviewed. No pertinent past medical history.  History reviewed. No pertinent surgical history.  No family history on file.  Social History[1]  Review of Systems   Constitutional: Negative.    HENT: Negative.     Eyes: Negative.    Respiratory: Negative.     Cardiovascular: Negative.    Gastrointestinal: Negative.    Endocrine: Negative.    Genitourinary: Negative.    Musculoskeletal:  Positive for back pain.   Skin: Negative.    Allergic/Immunologic: Negative.    Neurological: Negative.  Negative for weakness and numbness.   Hematological: Negative.    Psychiatric/Behavioral: Negative.         Physical Exam     Initial Vitals [04/23/25 1941]   BP Pulse Resp Temp SpO2   117/73 (!) 54 18 98.8 °F (37.1 °C) 99 %      MAP       --         Physical Exam    Nursing note and vitals  reviewed.  Constitutional: Vital signs are normal. He appears well-developed and well-nourished. He is not diaphoretic. He is cooperative.  Non-toxic appearance. He does not have a sickly appearance. He does not appear ill. No distress.   HENT:   Head: Normocephalic and atraumatic.   Eyes: Conjunctivae, EOM and lids are normal. Pupils are equal, round, and reactive to light.   Neck: Trachea normal and phonation normal. Neck supple. No thyroid mass and no thyromegaly present.   Normal range of motion.   Full passive range of motion without pain.     Cardiovascular:  Normal rate, regular rhythm, S1 normal, S2 normal, normal heart sounds, intact distal pulses and normal pulses.           Pulmonary/Chest: Effort normal and breath sounds normal.   Musculoskeletal:      Cervical back: Normal, full passive range of motion without pain, normal range of motion and neck supple. No edema, erythema or rigidity. No spinous process tenderness or muscular tenderness. Normal range of motion.      Thoracic back: Spasms and tenderness present. No swelling, edema, deformity, signs of trauma, lacerations or bony tenderness. Decreased range of motion. No scoliosis.      Lumbar back: Normal.     Lymphadenopathy:     He has no cervical adenopathy.     He has no axillary adenopathy.   Neurological: He is alert and oriented to person, place, and time. He has normal strength and normal reflexes. He displays normal reflexes. No cranial nerve deficit or sensory deficit. He displays a negative Romberg sign. GCS eye subscore is 4. GCS verbal subscore is 5. GCS motor subscore is 6.   Skin: Skin is warm, dry and intact. Capillary refill takes less than 2 seconds. No rash noted.   Psychiatric: He has a normal mood and affect. His speech is normal and behavior is normal. Judgment and thought content normal. Cognition and memory are normal.         Medical Screening Exam   See Full Note    ED Course   Procedures  Labs Reviewed - No data to display        Imaging Results    None          Medications - No data to display  Medical Decision Making  LM has no evidence of a fracture; dislocation; retained foreign body; nerve, tendon, or vascular injury; compartment syndrome; DVT; septic joint, cellulitis, osteomyelitis, abscess, or other infection.    Data Reviewed/Counseling: I have reviwed the patient's vital signs, nursing notes, and other relevant tests/information. I had a detailed discussion regarding the historical points, exam findings, and any diagnostic results supporting the discharge diagnosis. I also discussed the need for outpatient follow-up and the need to return to the ED if symptoms worsen or if there are any questions or concerns that arise at home.      Risk  Prescription drug management.  Risk Details: RX for Medrol Dose Pack, Motrin and Robaxin                                      Clinical Impression:   Final diagnoses:  [S39.012A] Strain of lumbar region, initial encounter (Primary)  [Z02.89] Encounter to obtain excuse from work        ED Disposition Condition    Discharge Good          ED Prescriptions       Medication Sig Dispense Start Date End Date Auth. Provider    methocarbamoL (ROBAXIN) 500 MG Tab Take 1 tablet (500 mg total) by mouth 3 (three) times daily. for 5 days 15 tablet 4/23/2025 4/28/2025 Narinder Iqbal FNP    methylPREDNISolone (MEDROL DOSEPACK) 4 mg tablet Take as directed 1 tablet 4/23/2025 5/14/2025 Narinder Iqbal FNP    ibuprofen (ADVIL,MOTRIN) 800 MG tablet Take 1 tablet (800 mg total) by mouth every 6 (six) hours as needed for Pain. 20 tablet 4/23/2025 -- Narinder Iqbal FNP          Follow-up Information       Follow up With Specialties Details Why Contact Info    Local pcp                 Narinder Iqbal FNP  04/23/25 2053       Narinder Iqbal FNP  04/23/25 2053         [1]   Social History  Tobacco Use    Smoking status: Every Day    Smokeless tobacco: Never   Substance Use Topics    Alcohol use: Never         Narinder Iqbal, [facility]  04/23/25 2100